# Patient Record
Sex: MALE | Race: WHITE | NOT HISPANIC OR LATINO | Employment: FULL TIME | URBAN - METROPOLITAN AREA
[De-identification: names, ages, dates, MRNs, and addresses within clinical notes are randomized per-mention and may not be internally consistent; named-entity substitution may affect disease eponyms.]

---

## 2023-03-23 ENCOUNTER — HOSPITAL ENCOUNTER (OUTPATIENT)
Dept: DATA CONVERSION | Facility: HOSPITAL | Age: 58
End: 2023-03-23
Attending: SURGERY | Admitting: SURGERY

## 2023-03-23 DIAGNOSIS — E78.5 HYPERLIPIDEMIA, UNSPECIFIED: ICD-10-CM

## 2023-03-23 DIAGNOSIS — K80.10 CALCULUS OF GALLBLADDER WITH CHRONIC CHOLECYSTITIS WITHOUT OBSTRUCTION: ICD-10-CM

## 2023-03-23 DIAGNOSIS — K21.9 GASTRO-ESOPHAGEAL REFLUX DISEASE WITHOUT ESOPHAGITIS: ICD-10-CM

## 2023-03-23 DIAGNOSIS — K80.00 CALCULUS OF GALLBLADDER WITH ACUTE CHOLECYSTITIS WITHOUT OBSTRUCTION: ICD-10-CM

## 2023-04-12 LAB
COMPLETE PATHOLOGY REPORT: NORMAL
CONVERTED CLINICAL DIAGNOSIS-HISTORY: NORMAL
CONVERTED FINAL DIAGNOSIS: NORMAL
CONVERTED FINAL REPORT PDF LINK TO COPY AND PASTE: NORMAL
CONVERTED GROSS DESCRIPTION: NORMAL

## 2023-09-08 VITALS — BODY MASS INDEX: 34.92 KG/M2 | WEIGHT: 230.38 LBS | HEIGHT: 68 IN

## 2023-09-14 NOTE — H&P
History & Physical Reviewed:   I have reviewed the History and Physical dated:  22-Mar-2023   History and Physical reviewed and relevant findings noted. Patient examined to review pertinent physical  findings.: No significant changes   Home Medications Reviewed: no changes noted   Allergies Reviewed: no changes noted       ERAS (Enhanced Recovery After Surgery):  ·  ERAS Patient: no     Consent:   COVID-19 Consent:  ·  COVID-19 Risk Consent Surgeon has reviewed key risks related to the risk of karel COVID-19 and if they contract COVID-19 what the risks are.       Electronic Signatures:  Peyman Fernando)  (Signed 23-Mar-2023 14:34)   Authored: History & Physical Reviewed, ERAS, Consent,  Note Completion      Last Updated: 23-Mar-2023 14:34 by Peyman Fernando)

## 2023-10-02 NOTE — OP NOTE
Post Operative Note:     Post-Procedure Diagnosis: Cholelithiasis with acute  and chronic cholecystitis   Procedure: 1.  Laparoscopic cholecystectomy with  cholangiogram  2.   3.   4.   5.   Surgeon: Dr. Fernando   Resident/Fellow/Other Assistant: None   Estimated Blood Loss (mL): 25   Specimen: yes   Findings: Markedly inflamed gallbladder.  Obstructed cystic duct.     Attestation:   Note Completion:  Attending Attestation I performed the procedure without a resident         Electronic Signatures:  Peyman Fernando)  (Signed 23-Mar-2023 16:26)   Authored: Post Operative Note, Note Completion      Last Updated: 23-Mar-2023 16:26 by Peyman Fernando)

## 2024-05-06 NOTE — OP NOTE
SURGEON:  Peyman Fernando MD    PREOPERATIVE DIAGNOSIS:    Cholelithiasis with acute cholecystitis.    POSTOPERATIVE DIAGNOSIS:    Cholelithiasis with acute on chronic cholecystitis.    PROCEDURE:    Laparoscopic cholecystectomy with cholangiogram.    ANESTHESIA:    General endotracheal.    INDICATIONS:    The patient is a 57-year-old male with acute cholecystitis.    DESCRIPTION OF PROCEDURE:    Following informed consent, the patient was taken to the operating  room, placed in supine position.  Huddle was performed.  He was given  general anesthetic.  Sequential compression devices were in place and  functioning.  He was given Ancef IV in the operating.  The abdomen  was prepped and draped in sterile fashion.  A time-out was performed.   A midline umbilical skin incision was made.  0 Vicryl sutures were  placed laterally in the fascia and the fascia was opened in the  midline.  A blunt 12 mm port was inserted under direct vision,  secured via Vicryl sutures.  The abdomen was insufflated with carbon  dioxide to a pressure of 12 mmHg.  A scope was inserted.  The patient  was found to have mild dilatation of his colon.  Two 5 mm ports were  placed under direct vision; 1 into the epigastrium, 1 into the right  upper quadrant.  The gallbladder was found to be distended and  inflamed and thickened.  A needle aspirator was inserted and 40 mL of  thick green bile was suctioned from the gallbladder.  The triangle of  Calot was dissected.  The patient had adhesions of the duodenum and  omentum to the region of the gallbladder.  The triangle of Calot was  carefully dissected sharply.  Two structures were seen entering the  gallbladder, cystic duct, and cystic artery.  Posterior portion of  liver plate was freed from the gallbladder.  The cystic duct was  clipped adjacent to the neck of the gallbladder.  The cystic duct was  found to be dilated.  Cystic duct was opened and milked retrograde.  A CT scan preop had shown  a stone within the cystic duct with dilated  cystic duct and preop HIDA scan showed nonfilling of the gallbladder.   Some debris was milked from the cystic duct.  A cholangiocatheter  was percutaneously placed into the right upper quadrant through a  14-gauge Angiocath.  The tip of the cholangiocatheter was placed into  the cystic duct and clipped into place.  Cholangiograms were  performed, which showed minimal filling of the ductal system.  Most  of the contrast extravasated due to the obstruction of the cystic  duct.  The clip in cholangiocatheter was removed.  The cystic duct  was further dissected.  An area felt to be stone was crushed within  the cystic duct and again the duct was milked retrograde.  A  cholangiogram was repeated and showed good flow of contrast through  the ductal system with good flow of contrast into the duodenum.  The  radiologist questioned whether there could be an air bubble.  This  does not have the appearance of the stone.  The cholangiogram looked  normal on fluoroscopy.  The clip in cholangiocatheter was removed.  The cystic duct was triply clipped proximal with care being taken not  to injure or occlude the common bile duct.  The cystic duct was  divided between clips.  The cystic artery was triply clipped  proximal, singly clipped distal and divided.  A posterior branch was  doubly clipped proximal.  The gallbladder was freed from the liver  with electrocautery and placed into an EndoCatch bag.  The right  upper quadrant was irrigated and suctioned until clear.  With a 5 mm  scope in the epigastric port, the gallbladder was removed through the  umbilical port without difficulty.  The fascia at the umbilical port  was closed with a running 0 Vicryl suture obtaining an airtight  closure.  The remaining ports removed under direct vision.  The  abdomen was deflated.  0.5% plain Marcaine was infiltrated into each  of the fascial incisions.  Skin was closed with running 4-0  Vicryl  subcuticular sutures.  Dressings were placed.  The patient was taken  to the recovery room in satisfactory condition, having tolerated the  procedure well.  Counts were correct.  Due to the significant  inflammatory changes of the gallbladder and of the region of the  arnulfo hepatis and triangle of   Calot, the operation took longer than normal and qualifies for  modifier 22.       Peyman Fernando MD    DD:  03/23/2023 16:36:53 EST  DT:  03/24/2023 04:37:28 EST  DICTATION NUMBER:  [id number]  INTERNAL JOB NUMBER:  [id number]      cc:             MD Luca Carrillo MD          Electronic Signatures:  Peyman Fernando (MD) (Signed on 24-Mar-2023 06:30)   Authored  Unsigned, Draft (SYS GENERATED) (Entered on 24-Mar-2023 04:37)   Entered    Last Updated: 24-Mar-2023 06:30 by Peyman Fernando)